# Patient Record
Sex: MALE
[De-identification: names, ages, dates, MRNs, and addresses within clinical notes are randomized per-mention and may not be internally consistent; named-entity substitution may affect disease eponyms.]

---

## 2021-02-08 PROBLEM — Z00.00 ENCOUNTER FOR PREVENTIVE HEALTH EXAMINATION: Status: ACTIVE | Noted: 2021-02-08

## 2021-02-11 ENCOUNTER — APPOINTMENT (OUTPATIENT)
Dept: ORTHOPEDIC SURGERY | Facility: CLINIC | Age: 46
End: 2021-02-11

## 2021-02-11 DIAGNOSIS — E11.9 TYPE 2 DIABETES MELLITUS W/OUT COMPLICATIONS: ICD-10-CM

## 2021-02-11 DIAGNOSIS — M51.26 OTHER INTERVERTEBRAL DISC DISPLACEMENT, LUMBAR REGION: ICD-10-CM

## 2021-02-11 NOTE — DISCUSSION/SUMMARY
[de-identified] : 46 M w history of LLE pain and numbness w MR showing hernations at 4-5 and L5-S1. pt has not yet tried non-op management and is electing for PT and Grey at this time. will send to Dr. Ramos for injection and Dr. Dumont for follow up.\par - PT\par - Grey\par - Follow up

## 2021-02-11 NOTE — PHYSICAL EXAM
[de-identified] : general: well appearing in NAD\par \par RLE:\par normal Range of motion \par no gross deformity\par SILT L1-S1\par 2+ DP pulse \par 5/5 EHL/FHL/TA/GS\par \par LLE:\par normal Range of motion \par no gross deformity\par diminished sensation over L5 and S1\par 2+ DP pulse \par 5/5 EHL/FHL/TA/GS\par \par no clonus\par down going babinski \par \par  [de-identified] : herniations L4-5 and L5-S1 w facet widening

## 2021-02-11 NOTE — HISTORY OF PRESENT ILLNESS
[de-identified] : 46M w a history of diabetes and anxiety presenting w pain running down the L leg and numbness. pt states this started at work and has progressively worsened over the last month. Now reports occasionally having bouts of instability where he feels like he has to fall. denies any bowel or bladder issues. states the numbness is in the LLE.

## 2021-02-19 ENCOUNTER — APPOINTMENT (OUTPATIENT)
Dept: PHYSICAL MEDICINE AND REHAB | Facility: CLINIC | Age: 46
End: 2021-02-19
Payer: COMMERCIAL

## 2021-02-19 PROCEDURE — 99072 ADDL SUPL MATRL&STAF TM PHE: CPT

## 2021-02-19 PROCEDURE — 99204 OFFICE O/P NEW MOD 45 MIN: CPT

## 2021-02-19 RX ORDER — METHYLPREDNISOLONE 4 MG/1
4 TABLET ORAL
Qty: 2 | Refills: 0 | Status: ACTIVE | COMMUNITY
Start: 2021-02-19 | End: 1900-01-01

## 2021-03-18 ENCOUNTER — APPOINTMENT (OUTPATIENT)
Dept: PHYSICAL MEDICINE AND REHAB | Facility: CLINIC | Age: 46
End: 2021-03-18
Payer: COMMERCIAL

## 2021-03-18 PROCEDURE — 99072 ADDL SUPL MATRL&STAF TM PHE: CPT

## 2021-03-18 PROCEDURE — 64484 NJX AA&/STRD TFRM EPI L/S EA: CPT | Mod: LT

## 2021-03-18 PROCEDURE — 64483 NJX AA&/STRD TFRM EPI L/S 1: CPT | Mod: LT

## 2021-04-02 ENCOUNTER — APPOINTMENT (OUTPATIENT)
Dept: PHYSICAL MEDICINE AND REHAB | Facility: CLINIC | Age: 46
End: 2021-04-02
Payer: COMMERCIAL

## 2021-04-02 VITALS — WEIGHT: 204 LBS | RESPIRATION RATE: 16 BRPM | OXYGEN SATURATION: 98 % | BODY MASS INDEX: 28.56 KG/M2 | HEIGHT: 71 IN

## 2021-04-02 PROCEDURE — 99214 OFFICE O/P EST MOD 30 MIN: CPT

## 2021-04-02 PROCEDURE — 99072 ADDL SUPL MATRL&STAF TM PHE: CPT

## 2021-04-02 NOTE — REASON FOR VISIT
[Follow-Up] : a follow-up visit [FreeTextEntry1] : Low back pain. [FreeTextEntry2] : Lakshmi  [TWNoteComboBox1] : [place]

## 2021-04-02 NOTE — DATA REVIEWED
[FreeTextEntry1] : MRI [facility] 02/2021:  L5-S1 central/left paracentral disc herniation narrows the lateral recesses and impinges upon the descending S1 nerve root.  L4-5 left paracentral disc herniation with left lateral recess stenosis and compression of the descending left L5 nerve root.

## 2021-04-02 NOTE — PHYSICAL EXAM
[FreeTextEntry1] : LUMBAR\par GEN: AAOx3. NAD.\par LS ROM: Flexion limited (+) LLE Sx. Extension, side-bending, rotation, oblique extension all full/pain free.  \par HIP ROM: Full and pain B/L.\par PALP: No TTP midline spinous processes, paravertebral muscles, SIJ, or greater trochanters B/L.\par INSP: Spine alignment is midline, with no evidence of scoliosis.\par STRENGTH: 4/5 L-EHL. Otherwise 5/5 BLE\par SENS: Grossly intact to LT BLE.\par REFLEXES: Symmetric patella, achilles. \par TONE: Normal, No clonus.\par STANCE: No Trendelenburg with single leg stance.\par GAIT: (+) antalgic, normal reciprocating heel to toe\par SPECIAL: SLR and Slump (+) LEFT. FADIR/JEMMA (-) B/L.

## 2021-04-02 NOTE — HISTORY OF PRESENT ILLNESS
[FreeTextEntry1] : Mr. DENA GARCIA is a very pleasant 46 year male who presents today for follow up evaluation of low back and left leg pain after undergoing an TOREY on 03/18/21. The patient reports approximately 50% improvement following the procedure at this time. There is still some residual discomfort, which remains primarily lower back with radiating pain towards the left lower extremity and tingling into the left foot. The pain is rated as 4/10 and ranges from 3-7/10. The patient's symptoms are aggravated by ambulating  and alleviated by nothing. He is now ambulating without AD. The patient denies any night pain, numbness/tingling, weakness, or bowel/bladder dysfunction. The patient has no other complaints at this time.

## 2021-04-02 NOTE — ASSESSMENT
[FreeTextEntry1] : Impression:\par 1. Left L5/S1 Radiculopathy\par \par Plan: The history, physical examination, and imaging were reviewed. Symptoms remain consistent with Left L5/S1 Radiculopathy. The patient has shown a positive response to TOREY with 50% improvement after one injection. The imaging results and diagnosis were discussed with the patient along with treatment options. The patient is interested in further interventional options for greater symptom reduction. We will plan for repeat Left L5/S1 TFESI. We discussed all the risks, benefits, alternative treatments, and prognosis. The patient expressed understanding and would like to move forward. We will schedule for the injection as well as follow up post-procedure. The patient expressed verbal understanding and is in agreement with the plan of care. All of the patient's questions and concerns were addressed during today's visit.\par

## 2021-04-22 ENCOUNTER — APPOINTMENT (OUTPATIENT)
Dept: PHYSICAL MEDICINE AND REHAB | Facility: CLINIC | Age: 46
End: 2021-04-22
Payer: COMMERCIAL

## 2021-04-22 DIAGNOSIS — M51.17 INTERVERTEBRAL DISC DISORDERS WITH RADICULOPATHY, LUMBOSACRAL REGION: ICD-10-CM

## 2021-04-22 PROCEDURE — 64484 NJX AA&/STRD TFRM EPI L/S EA: CPT | Mod: LT

## 2021-04-22 PROCEDURE — 99072 ADDL SUPL MATRL&STAF TM PHE: CPT

## 2021-04-22 PROCEDURE — 64483 NJX AA&/STRD TFRM EPI L/S 1: CPT | Mod: LT

## 2021-05-07 ENCOUNTER — APPOINTMENT (OUTPATIENT)
Dept: PHYSICAL MEDICINE AND REHAB | Facility: CLINIC | Age: 46
End: 2021-05-07